# Patient Record
Sex: MALE | Race: WHITE | NOT HISPANIC OR LATINO | URBAN - METROPOLITAN AREA
[De-identification: names, ages, dates, MRNs, and addresses within clinical notes are randomized per-mention and may not be internally consistent; named-entity substitution may affect disease eponyms.]

---

## 2023-10-23 PROBLEM — H65.21 RIGHT CHRONIC SEROUS OTITIS MEDIA: Status: ACTIVE | Noted: 2023-10-23

## 2023-10-23 PROBLEM — H90.A31 MIXED CONDUCTIVE AND SENSORINEURAL HEARING LOSS, UNILATERAL, RIGHT EAR WITH RESTRICTED HEARING ON THE CONTRALATERAL SIDE: Status: ACTIVE | Noted: 2023-10-23

## 2023-10-23 PROBLEM — H90.6 MIXED HEARING LOSS, BILATERAL: Status: ACTIVE | Noted: 2023-10-23

## 2023-10-23 PROBLEM — H69.93 ETD (EUSTACHIAN TUBE DYSFUNCTION), BILATERAL: Status: ACTIVE | Noted: 2023-10-23

## 2023-10-23 PROBLEM — J31.0 CHRONIC RHINITIS: Status: ACTIVE | Noted: 2023-10-23

## 2023-10-23 RX ORDER — BLOOD SUGAR DIAGNOSTIC
STRIP MISCELLANEOUS
COMMUNITY
Start: 2021-03-22

## 2023-10-23 RX ORDER — DILTIAZEM HYDROCHLORIDE 240 MG/1
1 CAPSULE, EXTENDED RELEASE ORAL DAILY
COMMUNITY
Start: 2021-08-25

## 2023-10-23 RX ORDER — CETIRIZINE HYDROCHLORIDE 10 MG/1
10 TABLET ORAL
COMMUNITY
Start: 2022-02-07

## 2023-10-23 RX ORDER — FLUTICASONE FUROATE AND VILANTEROL 200; 25 UG/1; UG/1
1 POWDER RESPIRATORY (INHALATION) DAILY
COMMUNITY
Start: 2021-08-25

## 2023-10-23 RX ORDER — ALBUTEROL SULFATE 0.83 MG/ML
2.5 SOLUTION RESPIRATORY (INHALATION) EVERY 6 HOURS PRN
COMMUNITY
Start: 2021-08-31

## 2023-10-23 RX ORDER — INSULIN LISPRO 100 [IU]/ML
INJECTION, SOLUTION INTRAVENOUS; SUBCUTANEOUS
COMMUNITY
Start: 2021-12-07

## 2023-10-23 RX ORDER — CALCIUM CARBONATE 600 MG
600 TABLET ORAL
COMMUNITY
Start: 2022-02-07

## 2023-10-23 RX ORDER — NYSTATIN AND TRIAMCINOLONE ACETONIDE 100000; 1 [USP'U]/G; MG/G
CREAM TOPICAL
COMMUNITY
Start: 2022-02-07

## 2023-10-23 RX ORDER — AZELASTINE 1 MG/ML
2 SPRAY, METERED NASAL 2 TIMES DAILY
COMMUNITY
Start: 2022-06-23 | End: 2023-10-25 | Stop reason: ALTCHOICE

## 2023-10-23 RX ORDER — PAROXETINE HYDROCHLORIDE 20 MG/1
1 TABLET, FILM COATED ORAL DAILY
COMMUNITY
Start: 2021-08-25

## 2023-10-23 RX ORDER — LISINOPRIL 20 MG/1
1 TABLET ORAL DAILY
COMMUNITY
Start: 2021-08-25

## 2023-10-23 RX ORDER — FERROUS SULFATE 325(65) MG
65 TABLET ORAL
COMMUNITY
Start: 2022-02-07

## 2023-10-23 RX ORDER — MELOXICAM 15 MG/1
1 TABLET ORAL DAILY
COMMUNITY
Start: 2021-10-08

## 2023-10-25 ENCOUNTER — OFFICE VISIT (OUTPATIENT)
Dept: OTOLARYNGOLOGY | Facility: CLINIC | Age: 68
End: 2023-10-25
Payer: MEDICARE

## 2023-10-25 ENCOUNTER — CLINICAL SUPPORT (OUTPATIENT)
Dept: AUDIOLOGY | Facility: CLINIC | Age: 68
End: 2023-10-25
Payer: MEDICARE

## 2023-10-25 VITALS — HEIGHT: 68 IN | BODY MASS INDEX: 19.4 KG/M2 | WEIGHT: 128 LBS

## 2023-10-25 DIAGNOSIS — H69.93 ETD (EUSTACHIAN TUBE DYSFUNCTION), BILATERAL: ICD-10-CM

## 2023-10-25 DIAGNOSIS — H90.6 MIXED CONDUCTIVE AND SENSORINEURAL HEARING LOSS OF BOTH EARS: Primary | ICD-10-CM

## 2023-10-25 DIAGNOSIS — H74.313 ANKYLOSIS OF OSSICLES OF BOTH EARS: ICD-10-CM

## 2023-10-25 DIAGNOSIS — H90.6 MIXED HEARING LOSS, BILATERAL: Primary | ICD-10-CM

## 2023-10-25 PROBLEM — H90.A31 MIXED CONDUCTIVE AND SENSORINEURAL HEARING LOSS, UNILATERAL, RIGHT EAR WITH RESTRICTED HEARING ON THE CONTRALATERAL SIDE: Status: RESOLVED | Noted: 2023-10-23 | Resolved: 2023-10-25

## 2023-10-25 PROCEDURE — 99213 OFFICE O/P EST LOW 20 MIN: CPT | Performed by: OTOLARYNGOLOGY

## 2023-10-25 PROCEDURE — 1126F AMNT PAIN NOTED NONE PRSNT: CPT | Performed by: OTOLARYNGOLOGY

## 2023-10-25 PROCEDURE — 69210 REMOVE IMPACTED EAR WAX UNI: CPT | Performed by: OTOLARYNGOLOGY

## 2023-10-25 PROCEDURE — 1159F MED LIST DOCD IN RCRD: CPT | Performed by: OTOLARYNGOLOGY

## 2023-10-25 PROCEDURE — 92550 TYMPANOMETRY & REFLEX THRESH: CPT

## 2023-10-25 PROCEDURE — 1160F RVW MEDS BY RX/DR IN RCRD: CPT | Performed by: OTOLARYNGOLOGY

## 2023-10-25 PROCEDURE — 1036F TOBACCO NON-USER: CPT | Performed by: OTOLARYNGOLOGY

## 2023-10-25 PROCEDURE — 92557 COMPREHENSIVE HEARING TEST: CPT

## 2023-10-25 ASSESSMENT — ENCOUNTER SYMPTOMS
OCCASIONAL FEELINGS OF UNSTEADINESS: 0
LOSS OF SENSATION IN FEET: 0
DEPRESSION: 0

## 2023-10-25 ASSESSMENT — PAIN - FUNCTIONAL ASSESSMENT: PAIN_FUNCTIONAL_ASSESSMENT: 0-10

## 2023-10-25 ASSESSMENT — PAIN SCALES - GENERAL: PAINLEVEL_OUTOF10: 0 - NO PAIN

## 2023-10-25 NOTE — PROGRESS NOTES
ADULT AUDIOLOGY AUDIOMETRIC EVALUATION    Name:  Thomas Orozco  :  1955  Age:  68 y.o.  Date of Evaluation:  10/25/2023    HISTORY  THOMAS OROZCO, age 67 year, was referred by Miguelito Carter MD for an audiological evaluation in conjunction with ENT appointment today. Hx of asthma and chronic rhinosinusitis s/p sinus surgery. Hx of mucoid effusion on the right side and in 2022 Dr. Zaman placed a PE tube in the right ear that has now been removed. Hx of intermittent otorrhea that was treated with antibiotics and has resolved. No hx of hearing aid use and no recent ear pain or pressure.    AUDIOLOGIC EVALUATION    OTOSCOPY  Otoscopic inspection revealed clear canals and visualization of the eardrum bilaterally.    IMMITTANCE  Normal tympanograms were obtained bilaterally, consistent with a normal moving eardrums and the likely absence of fluid.    Ipsilateral acoustic reflexes were tested and absent  at 500Hz, 1000Hz, 2000Hz, and 4000Hz bilaterally.    AUDIOMETRIC TESTING  Pure tone audiometry conducted via insert headphones from 125 Hz - 8000 Hz with good reliability was consistent with mild sloping to severe mixed hearing loss bilaterally. Improved from  hearing assessment.     SPEECH RECOGNITION TESTING (SRT)  SRT was in agreement with pure tone averages bilaterally ( Right: 30 dB HL, Left: 35 dB HL).    WORD RECOGNITION SCORE (WRS)  WRS was (100%) in the right ear and (100%) in the left ear using recorded ordered by difficulty NU6 word list.    RECOMMENDATIONS:  Treatment Plan:.  Follow up with ENT/PCP as recommended.  Annual hearing assessments as recommended. Follow up sooner if patient feels hearing or symptoms have changed.  Contact the clinic with questions or concerns at [phone].     PATIENT EDUCATION:   Discussed results and recommendations with patient.  Questions were addressed and the patient was encouraged to contact our department should concerns arise.      Maddie Monroe,  Giorgio, CCC-A, [facility]  Licensed Audiologist

## 2023-10-25 NOTE — LETTER
October 26, 2023     Kin Zaman MD    Patient: Geovanni Doyle   YOB: 1955   Date of Visit: 10/25/2023       Dear Dr. Kin Zaman MD:    Thank you for referring Geovanni Doyle to me for evaluation. Below are my notes for this consultation.  If you have questions, please do not hesitate to call me. I look forward to following your patient along with you.       Sincerely,     Miguelito Carrasco MD      CC: Dontae Oleary MD  ______________________________________________________________________________________            Reason for Consult:  1 YEAR FOLLOW UP     Subjective  History Of Present Illness:  Geovanni Doyle is a 68 y.o. male with asthma and chronic rhinosinusitis s/p sinus surgery. Last year he was having issues with mucoid effusion on the right side, and in April 2022 Dr. Zaman placed a PE tube in the right ear. For a while, he had intermittent otorrhea that was treated with antibiotics and has resolved.    No new problems in the last year. His hearing has improved and has not had issues.       Past Medical History:  He has a past medical history of Personal history of other diseases of the respiratory system, Personal history of other drug therapy, Personal history of other endocrine, nutritional and metabolic disease, and Unspecified cataract.    Surgical History:  He has a past surgical history that includes Other surgical history (02/07/2022).     Social History:  He has no history on file for tobacco use, alcohol use, and drug use.    Family History:  family history includes No Known Problems in his father and mother.     Medications:  Current Outpatient Medications   Medication Instructions   • acetaminophen (TYLENOL ORAL) oral   • albuterol 2.5 mg, inhalation, Every 6 hours PRN   • azelastine (Astelin) 137 mcg (0.1 %) nasal spray 2 sprays, Each Nostril, 2 times daily, For 3 month   • calcium carbonate 600 mg, oral   • cetirizine (ZYRTEC) 10 mg, oral   •  "dilTIAZem XR (DILT-XR) 240 mg 24 hr capsule 1 capsule, oral, Daily   • ferrous sulfate 325 (65 Fe) MG tablet 65 mg of iron, oral   • fluticasone furoate-vilanteroL (Breo Ellipta) 200-25 mcg/dose inhaler 1 puff, inhalation, Daily   • glucagon (GLUCAGEN) 1 mg   • insulin lispro (HumaLOG) 100 unit/mL injection subcutaneous, INJECT UP TO a maximum 75 UNITS SUBCUTANEOUSLY via insulin pump<BR>   • lisinopril 20 mg tablet 1 tablet, oral, Daily   • meloxicam (Mobic) 15 mg tablet 1 tablet, oral, Daily   • nystatin-triamcinolone (Mycolog II) cream Nystatin-Triamcinolone 867321-5.1 UNIT/GM-% External Cream   Refills: 0        Start : 7-Feb-2022   Active   • OneTouch Ultra Test strip test blood sugar FIVE TIMES daily<BR>   • PARoxetine (Paxil) 20 mg tablet 1 tablet, oral, Daily   • turmeric, bulk, 95 % powder oral   • vitamin-B complex split tablet oral      Allergies:  Patient has no known allergies.    Review of Systems:   A comprehensive 10-point review of systems was obtained including constitutional, neurological, HEENT, pulmonary, cardiovascular, genito-urinary, and other pertinent systems and was negative except as noted in the HPI.     Objective  Physical Exam:  Last Recorded Vitals: Height 1.727 m (5' 8\"), weight 58.1 kg (128 lb).    On physical exam, the patient is a well-nourished, well-developed patient, in no acute distress, able to communicate without assistance in English language. Head and face is atraumatic and normocephalic. Salivary glands are intact. Facial strength is symmetrical bilaterally.       On ear examination:  Right ear: The patient has cerumen impaction which was removed. The tympanic membrane is intact. There is good ventilation of the middle ear space. BC>AC  Left ear: The patient has cerumen impaction which was removed. The tympanic membrane is intact.  BC>AC  The Oropeza is midline    On vestibular exam, the patient has no spontaneous nystagmus, no headshake nystagmus, no head-thrust nystagmus, " and no nystagmus on hyperventilation or Valsalva maneuvers. [place]-Hallpike maneuver is negative bilaterally.       On neuro exam, the patient is alert and oriented x3, cranial nerves are grossly intact, cerebellar exam is normal.      The rest of the exam, including anterior rhinoscopy, oropharyngeal exam, neck exam, and cardiovascular exam, were normal including no palpable lymphadenopathies, thyroid in the midline position, normal pulses, and normal chest excursion.       Reviewed Results:  Audiology Testing:  I personally reviewed his audiogram from 10/2023 that shows moderate mixed hearing loss in the mid and low frequencies down sloping to moderate-severe SNHL in the high frequencies. He has a 20 dB ABG and Type A tympanograms. He has a 100% discrimination bilaterally.        I personally reviewed his audiogram from 09/2022 which shows a mild conductive hearing in the low frequencies upsloping to normal in the mid frequencies and downsloping to high frequencies SNHL. He has type B tympanograms on the right and type A on the left and 100% discrimination bilaterally.      I personally reviewed his audiogram from 02/2022 that showed a mild SNHL on the left downsloping to moderately severe levels. On the right he as a moderate mixed haring loss downsloping to moderately severe levels with a 20 db ABG in the low frequencies. 100% discrimination bilaterally       Imaging:  CT IAC from 02/2022 which showed partial opacification of the mastoid air cells and middle ear on the right. Left is open and patent. The ossicular chain is intact and there is no 3rd window effects or scutum erosion.      Procedure:  None    Assessment/Plan    1. Mixed hearing loss, bilateral    2. ETD (Eustachian tube dysfunction), bilateral    3. Ankylosis of ossicles of both ears        In summary, Geovanni Doyle is a 68 y.o. male with  is a 67 year old male with asthma and chronic rhinosinusitis s/p sinus surgery. Last year he was having  issues with mucoid effusion on the right side, and in April 2022 Dr. Zaman placed a PE tube in the right ear. For a while, he had intermittent otorrhea that was treated with antibiotics and has resolved.     In the last year, he has had good ventilation of the middle ear space. The previous PE tubes are out. Despite this, he continues to have a 20 dB ABG in the low frequencies bilaterally. This is likely due to otosclerosis or ossicular ankylosis.     The patient's hearing loss is not bothersome. We discussed options of a middle ear exploration vs hearing aids. He is not interested in hearing rehabilitation at this time. If he is interested in the future we will consider a new CT IAC.    For now I will see him back in one year with a repeat audiogram. A refill of Flonase and Azelastine were sent to his  pharmacy.        Scribe Attestation  By signing my name below, IRadha , Scribe attest that this documentation has been prepared under the direction and in the presence of Miguelito Carrasco MD.

## 2023-10-25 NOTE — PROGRESS NOTES
Reason for Consult:  1 YEAR FOLLOW UP     Subjective   History Of Present Illness:  Geovanni Doyle is a 68 y.o. male with asthma and chronic rhinosinusitis s/p sinus surgery. Last year he was having issues with mucoid effusion on the right side, and in April 2022 Dr. Zaman placed a PE tube in the right ear. For a while, he had intermittent otorrhea that was treated with antibiotics and has resolved.    No new problems in the last year. His hearing has improved and has not had issues.       Past Medical History:  He has a past medical history of Personal history of other diseases of the respiratory system, Personal history of other drug therapy, Personal history of other endocrine, nutritional and metabolic disease, and Unspecified cataract.    Surgical History:  He has a past surgical history that includes Other surgical history (02/07/2022).     Social History:  He has no history on file for tobacco use, alcohol use, and drug use.    Family History:  family history includes No Known Problems in his father and mother.     Medications:  Current Outpatient Medications   Medication Instructions    acetaminophen (TYLENOL ORAL) oral    albuterol 2.5 mg, inhalation, Every 6 hours PRN    azelastine (Astelin) 137 mcg (0.1 %) nasal spray 2 sprays, Each Nostril, 2 times daily, For 3 month    calcium carbonate 600 mg, oral    cetirizine (ZYRTEC) 10 mg, oral    dilTIAZem XR (DILT-XR) 240 mg 24 hr capsule 1 capsule, oral, Daily    ferrous sulfate 325 (65 Fe) MG tablet 65 mg of iron, oral    fluticasone furoate-vilanteroL (Breo Ellipta) 200-25 mcg/dose inhaler 1 puff, inhalation, Daily    glucagon (GLUCAGEN) 1 mg    insulin lispro (HumaLOG) 100 unit/mL injection subcutaneous, INJECT UP TO a maximum 75 UNITS SUBCUTANEOUSLY via insulin pump<BR>    lisinopril 20 mg tablet 1 tablet, oral, Daily    meloxicam (Mobic) 15 mg tablet 1 tablet, oral, Daily    nystatin-triamcinolone (Mycolog II) cream Nystatin-Triamcinolone  "025963-6.1 UNIT/GM-% External Cream   Refills: 0        Start : 7-Feb-2022   Active    OneTouch Ultra Test strip test blood sugar FIVE TIMES daily<BR>    PARoxetine (Paxil) 20 mg tablet 1 tablet, oral, Daily    turmeric, bulk, 95 % powder oral    vitamin-B complex split tablet oral      Allergies:  Patient has no known allergies.    Review of Systems:   A comprehensive 10-point review of systems was obtained including constitutional, neurological, HEENT, pulmonary, cardiovascular, genito-urinary, and other pertinent systems and was negative except as noted in the HPI.     Objective   Physical Exam:  Last Recorded Vitals: Height 1.727 m (5' 8\"), weight 58.1 kg (128 lb).    On physical exam, the patient is a well-nourished, well-developed patient, in no acute distress, able to communicate without assistance in English language. Head and face is atraumatic and normocephalic. Salivary glands are intact. Facial strength is symmetrical bilaterally.       On ear examination:  Right ear: The patient has cerumen impaction which was removed. The tympanic membrane is intact. There is good ventilation of the middle ear space. BC>AC  Left ear: The patient has cerumen impaction which was removed. The tympanic membrane is intact.  BC>AC  The Oropeza is midline    On vestibular exam, the patient has no spontaneous nystagmus, no headshake nystagmus, no head-thrust nystagmus, and no nystagmus on hyperventilation or Valsalva maneuvers. [place]-Hallpike maneuver is negative bilaterally.       On neuro exam, the patient is alert and oriented x3, cranial nerves are grossly intact, cerebellar exam is normal.      The rest of the exam, including anterior rhinoscopy, oropharyngeal exam, neck exam, and cardiovascular exam, were normal including no palpable lymphadenopathies, thyroid in the midline position, normal pulses, and normal chest excursion.       Reviewed Results:  Audiology Testing:  I personally reviewed his audiogram from 10/2023 that " shows moderate mixed hearing loss in the mid and low frequencies down sloping to moderate-severe SNHL in the high frequencies. He has a 20 dB ABG and Type A tympanograms. He has a 100% discrimination bilaterally.        I personally reviewed his audiogram from 09/2022 which shows a mild conductive hearing in the low frequencies upsloping to normal in the mid frequencies and downsloping to high frequencies SNHL. He has type B tympanograms on the right and type A on the left and 100% discrimination bilaterally.      I personally reviewed his audiogram from 02/2022 that showed a mild SNHL on the left downsloping to moderately severe levels. On the right he as a moderate mixed haring loss downsloping to moderately severe levels with a 20 db ABG in the low frequencies. 100% discrimination bilaterally       Imaging:  CT IAC from 02/2022 which showed partial opacification of the mastoid air cells and middle ear on the right. Left is open and patent. The ossicular chain is intact and there is no 3rd window effects or scutum erosion.      Procedure:  None    Assessment/Plan     1. Mixed hearing loss, bilateral    2. ETD (Eustachian tube dysfunction), bilateral    3. Ankylosis of ossicles of both ears        In summary, Geovanni Doyle is a 68 y.o. male with  is a 67 year old male with asthma and chronic rhinosinusitis s/p sinus surgery. Last year he was having issues with mucoid effusion on the right side, and in April 2022 Dr. Zaman placed a PE tube in the right ear. For a while, he had intermittent otorrhea that was treated with antibiotics and has resolved.     In the last year, he has had good ventilation of the middle ear space. The previous PE tubes are out. Despite this, he continues to have a 20 dB ABG in the low frequencies bilaterally. This is likely due to otosclerosis or ossicular ankylosis.     The patient's hearing loss is not bothersome. We discussed options of a middle ear exploration vs hearing aids. He  is not interested in hearing rehabilitation at this time. If he is interested in the future we will consider a new CT IAC.    For now I will see him back in one year with a repeat audiogram. A refill of Flonase and Azelastine were sent to his  pharmacy.        Scribe Attestation  By signing my name below, I, Radha Reynolds , Pricila attest that this documentation has been prepared under the direction and in the presence of Miguelito Carrasco MD.

## 2023-10-26 RX ORDER — AZELASTINE 1 MG/ML
2 SPRAY, METERED NASAL 2 TIMES DAILY
Qty: 30 ML | Refills: 4 | Status: SHIPPED | OUTPATIENT
Start: 2023-10-26 | End: 2024-01-24

## 2024-11-27 ENCOUNTER — APPOINTMENT (OUTPATIENT)
Dept: OTOLARYNGOLOGY | Facility: CLINIC | Age: 69
End: 2024-11-27
Payer: MEDICARE

## 2024-11-27 ENCOUNTER — APPOINTMENT (OUTPATIENT)
Dept: AUDIOLOGY | Facility: CLINIC | Age: 69
End: 2024-11-27
Payer: MEDICARE

## 2024-11-27 DIAGNOSIS — H74.313 ANKYLOSIS OF OSSICLES OF BOTH EARS: ICD-10-CM

## 2024-11-27 DIAGNOSIS — H69.93 ETD (EUSTACHIAN TUBE DYSFUNCTION), BILATERAL: ICD-10-CM

## 2024-11-27 DIAGNOSIS — H90.6 MIXED CONDUCTIVE AND SENSORINEURAL HEARING LOSS OF BOTH EARS: Primary | ICD-10-CM

## 2024-11-27 DIAGNOSIS — H90.6 MIXED HEARING LOSS, BILATERAL: Primary | ICD-10-CM

## 2024-11-27 PROCEDURE — 1126F AMNT PAIN NOTED NONE PRSNT: CPT | Performed by: OTOLARYNGOLOGY

## 2024-11-27 PROCEDURE — 99213 OFFICE O/P EST LOW 20 MIN: CPT | Performed by: OTOLARYNGOLOGY

## 2024-11-27 PROCEDURE — 1160F RVW MEDS BY RX/DR IN RCRD: CPT | Performed by: OTOLARYNGOLOGY

## 2024-11-27 PROCEDURE — 1159F MED LIST DOCD IN RCRD: CPT | Performed by: OTOLARYNGOLOGY

## 2024-11-27 ASSESSMENT — PATIENT HEALTH QUESTIONNAIRE - PHQ9
SUM OF ALL RESPONSES TO PHQ9 QUESTIONS 1 AND 2: 0
1. LITTLE INTEREST OR PLEASURE IN DOING THINGS: NOT AT ALL
2. FEELING DOWN, DEPRESSED OR HOPELESS: NOT AT ALL

## 2024-11-27 ASSESSMENT — COLUMBIA-SUICIDE SEVERITY RATING SCALE - C-SSRS: 1. IN THE PAST MONTH, HAVE YOU WISHED YOU WERE DEAD OR WISHED YOU COULD GO TO SLEEP AND NOT WAKE UP?: NO

## 2024-11-27 ASSESSMENT — PAIN SCALES - GENERAL: PAINLEVEL_OUTOF10: 0-NO PAIN

## 2024-11-27 NOTE — LETTER
November 28, 2024     Kin Zaman MD  Office Address Unavailable  As Of 7/1/2023    Patient: Geovanni Doyle   YOB: 1955   Date of Visit: 11/27/2024       Dear Dr. Kin Zaman MD:    Thank you for referring Geovanni Doyle to me for evaluation. Below are my notes for this consultation.  If you have questions, please do not hesitate to call me. I look forward to following your patient along with you.       Sincerely,     Miguelito Carrasco MD      CC: Dontae Oleary MD  ______________________________________________________________________________________            Reason for Consult:  Hearing Screening and Follow-up     Subjective  History Of Present Illness:  Geovanni Doyle is a 69 y.o. male with asthma and chronic rhinosinusitis s/p sinus surgery. He was having issues with mucoid effusion on the right side, and in April 2022 Dr. Zaman placed a PE tube in the right ear. For a while, he had intermittent otorrhea that was treated with antibiotics with resolution.     He now has had good ventilation of the middle ear space. The previous PE tubes are out. Despite this, he continues to have a 20 dB ABG in the low frequencies bilaterally. This is likely due to otosclerosis or ossicular ankylosis.     In the past we discussed hearing rehabilitation and is not interested in this. He used Flonase and azelastine in the past and are no longer covered by  his insurance. He has been doing well. No new changes to his hearing. He is happy with his hearing at this time.        Past Medical History:  He has a past medical history of Personal history of other diseases of the respiratory system, Personal history of other drug therapy, Personal history of other endocrine, nutritional and metabolic disease, and Unspecified cataract.    Surgical History:  He has a past surgical history that includes Other surgical history (02/07/2022).     Social History:  He reports that he has quit smoking. His  smoking use included cigarettes. He has never used smokeless tobacco. He reports that he does not currently use alcohol. He reports that he does not use drugs.    Family History:  family history includes No Known Problems in his father and mother.     Medications:  Current Outpatient Medications   Medication Instructions   • acetaminophen (TYLENOL ORAL) oral   • albuterol 2.5 mg, inhalation, Every 6 hours PRN   • azelastine (Astelin) 137 mcg (0.1 %) nasal spray 2 sprays, Each Nostril, 2 times daily, For 3 month   • calcium carbonate 600 mg, oral   • cetirizine (ZYRTEC) 10 mg, oral   • dilTIAZem XR (DILT-XR) 240 mg 24 hr capsule 1 capsule, oral, Daily   • ferrous sulfate 325 (65 Fe) MG tablet 65 mg of iron, oral   • fluticasone (Veramyst) 27.5 mcg/actuation nasal spray 1 spray, Each Nostril, Daily RT   • fluticasone furoate-vilanteroL (Breo Ellipta) 200-25 mcg/dose inhaler 1 puff, inhalation, Daily   • glucagon (GLUCAGEN) 1 mg   • insulin lispro (HumaLOG) 100 unit/mL injection subcutaneous, INJECT UP TO a maximum 75 UNITS SUBCUTANEOUSLY via insulin pump     • lisinopril 20 mg tablet 1 tablet, oral, Daily   • meloxicam (Mobic) 15 mg tablet 1 tablet, oral, Daily   • nystatin-triamcinolone (Mycolog II) cream Nystatin-Triamcinolone 691634-7.1 UNIT/GM-% External Cream   Refills: 0        Start : 7-Feb-2022   Active   • OneTouch Ultra Test strip test blood sugar FIVE TIMES daily     • PARoxetine (Paxil) 20 mg tablet 1 tablet, oral, Daily   • turmeric, bulk, 95 % powder oral   • vitamin-B complex split tablet oral      Allergies:  Patient has no known allergies.    Review of Systems:   A comprehensive 10-point review of systems was obtained including constitutional, neurological, HEENT, pulmonary, cardiovascular, genito-urinary, and other pertinent systems and was negative except as noted in the HPI.     Objective  Physical Exam:  Last Recorded Vitals: There were no vitals taken for this visit.    On physical exam, the  patient is a well-nourished, well-developed patient, in no acute distress, able to communicate without assistance in English language. Head and face is atraumatic and normocephalic. Salivary glands are intact. Facial strength is symmetrical bilaterally.       On ear examination:  Right ear: The patient has cerumen impaction which was removed. The tympanic membrane is intact. There is good ventilation of the middle ear space. BC>AC  Left ear: The patient has cerumen impaction which was removed. The tympanic membrane is intact.  BC>AC  The Oropeza is midline    On vestibular exam, the patient has no spontaneous nystagmus, no headshake nystagmus, no head-thrust nystagmus, and no nystagmus on hyperventilation or Valsalva maneuvers. [place]-Hallpike maneuver is negative bilaterally.       On neuro exam, the patient is alert and oriented x3, cranial nerves are grossly intact, cerebellar exam is normal.      The rest of the exam, including anterior rhinoscopy, oropharyngeal exam, neck exam, and cardiovascular exam, were normal including no palpable lymphadenopathies, thyroid in the midline position, normal pulses, and normal chest excursion.       Reviewed Results:  Audiology Testing:  I personally reviewed his audiogram from 11/2024 that shows a mild down sloping to moderate severe mixed hearing loss with 15 dB ABG bilaterally. 100% discrimination and type A tympanograms.       I personally reviewed his audiogram from 10/2023 that shows moderate mixed hearing loss in the mid and low frequencies down sloping to moderate-severe SNHL in the high frequencies. He has a 20 dB ABG and Type A tympanograms. He has a 100% discrimination bilaterally.        I personally reviewed his audiogram from 09/2022 which shows a mild conductive hearing in the low frequencies upsloping to normal in the mid frequencies and downsloping to high frequencies SNHL. He has type B tympanograms on the right and type A on the left and 100% discrimination  bilaterally.      I personally reviewed his audiogram from 02/2022 that showed a mild SNHL on the left downsloping to moderately severe levels. On the right he as a moderate mixed haring loss downsloping to moderately severe levels with a 20 db ABG in the low frequencies. 100% discrimination bilaterally       Imaging:  CT IAC from 02/2022 which showed partial opacification of the mastoid air cells and middle ear on the right. Left is open and patent. The ossicular chain is intact and there is no 3rd window effects or scutum erosion.      Procedure:  None    Assessment/Plan    1. Mixed hearing loss, bilateral    2. ETD (Eustachian tube dysfunction), bilateral    3. Ankylosis of ossicles of both ears          In summary, Geovanni Doyle is a 69 y.o. male with asthma and chronic rhinosinusitis s/p sinus surgery. He was having issues with mucoid effusion on the right side, and in April 2022 Dr. Zaman placed a PE tube in the right ear. For a while, he had intermittent otorrhea that was treated with antibiotics with resolution.     He now has had good ventilation of the middle ear space. The previous PE tubes are out. Despite this, he continues to have a 20 dB ABG in the low frequencies bilaterally. This is likely due to otosclerosis or ossicular ankylosis.     The patient's hearing loss is not bothersome. We previously discussed middle ear exploration vs hearing aids. He is not interested in hearing rehabilitation. His tympanograms are normal, and there is no further need for nasal sprays.    I will see him back as needed.       Scribe Attestation  By signing my name below, I, Pricila Moore attest that this documentation has been prepared under the direction and in the presence of Miguelito Carrasco MD.

## 2024-11-27 NOTE — PROGRESS NOTES
"AUDIOMETRIC EVALUATION       Name:  Geovanni Doyle  :  1955  Age:  69 y.o.  Date of Evaluation:  2024     HISTORY  Geovanni Doyle was seen today for a hearing evaluation due to known hearing loss and Last year he was having issues with mucoid effusion on the right side, and in 2022 Dr. Zaman placed a PE tube in the right ear. For a while, he had intermittent otorrhea that was treated with antibiotics and has resolved.     Denies tinnitus, vertigo, aural pain, drainage, fullness, history of familial hearing loss or noise exposure.    PROCEDURE:  Otoscopic Evaluation:    RIGHT: Clear ear canal and tympanic membrane visualized.  LEFT:  Clear ear canal and tympanic membrane visualized.    Immittance: Tympanometry (226 Hz probe tone) and Stapedial Acoustic Reflexes Thresholds (ART)(Probe ear):  RIGHT: Normal middle ear pressure, mobility, and ear canal volume. Ipsilateral ART absent 500-2000 Hz.  LEFT: Normal middle ear pressure, mobility, and ear canal volume. Ipsilateral ART absent 500-2000 Hz.    Pure Tone and Speech Audiometry:    Test Technique: Pure Tone Audiometry via insert earphones  Test Reliability: good    RIGHT: Moderate to mild conductive hearing loss through 3000 Hz sloping to moderately severe sensorineural hearing loss through 8000 Hz. Word Recognition score was excellent using recorded material (NU-6 10-word list ordered by difficulty).   LEFT: Mild conductive hearing loss through 3000 Hz sloping to severe sensorineural hearing loss through 8000 Hz. Word Recognition score was excellent using recorded material (NU-6 10-word list ordered by difficulty).     EVALUATION  See scanned Audiogram in \"Media\".    IMPRESSIONS:  Today's test results indicate stable hearing as compared to previous testing (). Mild to severe mixed hearing loss, bilaterally. Not interested in amplification at this time. Does not feel hearing impacts daily quality of life.    RECOMMENDATIONS:  Continue " medical follow-up with physician.  Return for audiologic assessment in conjunction with otologic care or annually.   Amplification options briefly discussed and literature provided re: hearing aids. Consider amplification pending medical clearance. Check insurance benefit for hearing aid benefits and in-network providers. To schedule an appointment for a hearing aid consultation call [phone].  Implement communication strategies (utilizing visual cues/gestures; reducing background noise and distance from desired source; increasing light to assist with visual cues; use of clear speech).     PATIENT EDUCATION:   Discussed results and recommendations with Geovanni Doyle.  Questions were addressed and the patient was encouraged to contact our department ([phone]) should concerns arise.    PATRICIA Bhatia, CCC-A  Senior Clinical Audiologist    TIME: [phone]

## 2024-11-27 NOTE — PROGRESS NOTES
Reason for Consult:  Hearing Screening and Follow-up     Subjective   History Of Present Illness:  Geovanni Doyle is a 69 y.o. male with asthma and chronic rhinosinusitis s/p sinus surgery. He was having issues with mucoid effusion on the right side, and in April 2022 Dr. Zaman placed a PE tube in the right ear. For a while, he had intermittent otorrhea that was treated with antibiotics with resolution.     He now has had good ventilation of the middle ear space. The previous PE tubes are out. Despite this, he continues to have a 20 dB ABG in the low frequencies bilaterally. This is likely due to otosclerosis or ossicular ankylosis.     In the past we discussed hearing rehabilitation and is not interested in this. He used Flonase and azelastine in the past and are no longer covered by  his insurance. He has been doing well. No new changes to his hearing. He is happy with his hearing at this time.        Past Medical History:  He has a past medical history of Personal history of other diseases of the respiratory system, Personal history of other drug therapy, Personal history of other endocrine, nutritional and metabolic disease, and Unspecified cataract.    Surgical History:  He has a past surgical history that includes Other surgical history (02/07/2022).     Social History:  He reports that he has quit smoking. His smoking use included cigarettes. He has never used smokeless tobacco. He reports that he does not currently use alcohol. He reports that he does not use drugs.    Family History:  family history includes No Known Problems in his father and mother.     Medications:  Current Outpatient Medications   Medication Instructions    acetaminophen (TYLENOL ORAL) oral    albuterol 2.5 mg, inhalation, Every 6 hours PRN    azelastine (Astelin) 137 mcg (0.1 %) nasal spray 2 sprays, Each Nostril, 2 times daily, For 3 month    calcium carbonate 600 mg, oral    cetirizine (ZYRTEC) 10 mg, oral    dilTIAZem XR  (DILT-XR) 240 mg 24 hr capsule 1 capsule, oral, Daily    ferrous sulfate 325 (65 Fe) MG tablet 65 mg of iron, oral    fluticasone (Veramyst) 27.5 mcg/actuation nasal spray 1 spray, Each Nostril, Daily RT    fluticasone furoate-vilanteroL (Breo Ellipta) 200-25 mcg/dose inhaler 1 puff, inhalation, Daily    glucagon (GLUCAGEN) 1 mg    insulin lispro (HumaLOG) 100 unit/mL injection subcutaneous, INJECT UP TO a maximum 75 UNITS SUBCUTANEOUSLY via insulin pump      lisinopril 20 mg tablet 1 tablet, oral, Daily    meloxicam (Mobic) 15 mg tablet 1 tablet, oral, Daily    nystatin-triamcinolone (Mycolog II) cream Nystatin-Triamcinolone 640525-8.1 UNIT/GM-% External Cream   Refills: 0        Start : 7-Feb-2022   Active    OneTouch Ultra Test strip test blood sugar FIVE TIMES daily      PARoxetine (Paxil) 20 mg tablet 1 tablet, oral, Daily    turmeric, bulk, 95 % powder oral    vitamin-B complex split tablet oral      Allergies:  Patient has no known allergies.    Review of Systems:   A comprehensive 10-point review of systems was obtained including constitutional, neurological, HEENT, pulmonary, cardiovascular, genito-urinary, and other pertinent systems and was negative except as noted in the HPI.     Objective   Physical Exam:  Last Recorded Vitals: There were no vitals taken for this visit.    On physical exam, the patient is a well-nourished, well-developed patient, in no acute distress, able to communicate without assistance in English language. Head and face is atraumatic and normocephalic. Salivary glands are intact. Facial strength is symmetrical bilaterally.       On ear examination:  Right ear: The patient has cerumen impaction which was removed. The tympanic membrane is intact. There is good ventilation of the middle ear space. BC>AC  Left ear: The patient has cerumen impaction which was removed. The tympanic membrane is intact.  BC>AC  The Oropeza is midline    On vestibular exam, the patient has no spontaneous  nystagmus, no headshake nystagmus, no head-thrust nystagmus, and no nystagmus on hyperventilation or Valsalva maneuvers. Pepe-Hallpike maneuver is negative bilaterally.       On neuro exam, the patient is alert and oriented x3, cranial nerves are grossly intact, cerebellar exam is normal.      The rest of the exam, including anterior rhinoscopy, oropharyngeal exam, neck exam, and cardiovascular exam, were normal including no palpable lymphadenopathies, thyroid in the midline position, normal pulses, and normal chest excursion.       Reviewed Results:  Audiology Testing:  I personally reviewed his audiogram from 11/2024 that shows a mild down sloping to moderate severe mixed hearing loss with 15 dB ABG bilaterally. 100% discrimination and type A tympanograms.       I personally reviewed his audiogram from 10/2023 that shows moderate mixed hearing loss in the mid and low frequencies down sloping to moderate-severe SNHL in the high frequencies. He has a 20 dB ABG and Type A tympanograms. He has a 100% discrimination bilaterally.        I personally reviewed his audiogram from 09/2022 which shows a mild conductive hearing in the low frequencies upsloping to normal in the mid frequencies and downsloping to high frequencies SNHL. He has type B tympanograms on the right and type A on the left and 100% discrimination bilaterally.      I personally reviewed his audiogram from 02/2022 that showed a mild SNHL on the left downsloping to moderately severe levels. On the right he as a moderate mixed haring loss downsloping to moderately severe levels with a 20 db ABG in the low frequencies. 100% discrimination bilaterally       Imaging:  CT IAC from 02/2022 which showed partial opacification of the mastoid air cells and middle ear on the right. Left is open and patent. The ossicular chain is intact and there is no 3rd window effects or scutum erosion.      Procedure:  None    Assessment/Plan     1. Mixed hearing loss, bilateral     2. ETD (Eustachian tube dysfunction), bilateral    3. Ankylosis of ossicles of both ears          In summary, Geovanni Doyle is a 69 y.o. male with asthma and chronic rhinosinusitis s/p sinus surgery. He was having issues with mucoid effusion on the right side, and in April 2022 Dr. Zaman placed a PE tube in the right ear. For a while, he had intermittent otorrhea that was treated with antibiotics with resolution.     He now has had good ventilation of the middle ear space. The previous PE tubes are out. Despite this, he continues to have a 20 dB ABG in the low frequencies bilaterally. This is likely due to otosclerosis or ossicular ankylosis.     The patient's hearing loss is not bothersome. We previously discussed middle ear exploration vs hearing aids. He is not interested in hearing rehabilitation. His tympanograms are normal, and there is no further need for nasal sprays.    I will see him back as needed.       Scribe Attestation  By signing my name below, I, Radha Reynolds , [employer] attest that this documentation has been prepared under the direction and in the presence of Miguelito Carrasco MD.